# Patient Record
Sex: MALE | Race: WHITE | NOT HISPANIC OR LATINO | Employment: OTHER | URBAN - METROPOLITAN AREA
[De-identification: names, ages, dates, MRNs, and addresses within clinical notes are randomized per-mention and may not be internally consistent; named-entity substitution may affect disease eponyms.]

---

## 2024-07-17 NOTE — TELEPHONE ENCOUNTER
Action 7/17/2024 @ 9:10 am   Action Taken 1) Called patient to ask about prior treatment. Patient did not answer. Voicemail was left.      REASON FOR VISIT: diabetic patient needing toenail clipped   DATE OF APPT: 8/19/2024   NOTES (FOR ALL VISITS) STATUS DETAILS   OFFICE NOTE from referring provider N/A    MEDICATION LIST In process    IMAGING  (FOR ALL VISITS)     MRI (HEAD, NECK, SPINE) In process    CT (HEAD, NECK, SPINE) In process

## 2024-08-19 ENCOUNTER — PRE VISIT (OUTPATIENT)
Dept: PODIATRY | Facility: CLINIC | Age: 84
End: 2024-08-19